# Patient Record
Sex: FEMALE | URBAN - METROPOLITAN AREA
[De-identification: names, ages, dates, MRNs, and addresses within clinical notes are randomized per-mention and may not be internally consistent; named-entity substitution may affect disease eponyms.]

---

## 2019-10-15 ENCOUNTER — OFFICE VISIT (OUTPATIENT)
Dept: URBAN - METROPOLITAN AREA CLINIC 11 | Facility: CLINIC | Age: 34
End: 2019-10-15
Payer: COMMERCIAL

## 2019-10-15 DIAGNOSIS — H40.023 OPEN ANGLE WITH BORDERLINE FINDINGS, HIGH RISK, BILATERAL: Primary | ICD-10-CM

## 2019-10-15 DIAGNOSIS — H50.9 UNSPECIFIED STRABISMUS: ICD-10-CM

## 2019-10-15 PROCEDURE — 92133 CPTRZD OPH DX IMG PST SGM ON: CPT | Performed by: OPTOMETRIST

## 2019-10-15 PROCEDURE — 92004 COMPRE OPH EXAM NEW PT 1/>: CPT | Performed by: OPTOMETRIST

## 2019-10-15 ASSESSMENT — KERATOMETRY
OD: 45.63
OS: 45.50

## 2019-10-15 ASSESSMENT — INTRAOCULAR PRESSURE
OS: 17
OD: 14

## 2019-10-15 NOTE — IMPRESSION/PLAN
Impression: Open angle with borderline findings, high risk, bilateral: H40.023. OCT 10/19 9/9 89/92 Plan: OCT of RNFL ordered and performed today. RNFL normal OU.  Monitor without drops for now. schedule VF
f/u 3mns VF IOP

## 2019-10-15 NOTE — IMPRESSION/PLAN
Impression: Unspecified strabismus: H50.9. Plan: No treatment is required at this time. Will continue to observe condition and or symptoms. Patient instructed to call if condition gets worse.